# Patient Record
Sex: MALE | Race: WHITE | NOT HISPANIC OR LATINO | URBAN - NONMETROPOLITAN AREA
[De-identification: names, ages, dates, MRNs, and addresses within clinical notes are randomized per-mention and may not be internally consistent; named-entity substitution may affect disease eponyms.]

---
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
## 2019-04-03 ENCOUNTER — APPOINTMENT (OUTPATIENT)
Dept: GENERAL RADIOLOGY | Age: 19
End: 2019-04-03

## 2019-04-03 ENCOUNTER — HOSPITAL ENCOUNTER (EMERGENCY)
Age: 19
Discharge: HOME | End: 2019-04-03
Attending: FAMILY MEDICINE
Payer: COMMERCIAL

## 2019-04-03 VITALS
HEIGHT: 71 IN | OXYGEN SATURATION: 99 % | DIASTOLIC BLOOD PRESSURE: 78 MMHG | RESPIRATION RATE: 20 BRPM | BODY MASS INDEX: 25.9 KG/M2 | HEART RATE: 78 BPM | WEIGHT: 185 LBS | SYSTOLIC BLOOD PRESSURE: 122 MMHG | TEMPERATURE: 98 F

## 2019-04-03 DIAGNOSIS — M62.838 SPASM OF MUSCLE: ICD-10-CM

## 2019-04-03 DIAGNOSIS — S39.012A STRAIN OF LUMBAR REGION, INITIAL ENCOUNTER: Primary | ICD-10-CM

## 2019-04-03 PROCEDURE — 72100 X-RAY EXAM L-S SPINE 2/3 VWS: CPT

## 2019-04-03 PROCEDURE — 6360000002 HC RX W HCPCS

## 2019-04-03 PROCEDURE — 96372 THER/PROPH/DIAG INJ SC/IM: CPT

## 2019-04-03 PROCEDURE — 99283 EMERGENCY DEPT VISIT LOW MDM: CPT

## 2019-04-03 PROCEDURE — 99283 EMERGENCY DEPT VISIT LOW MDM: CPT | Performed by: FAMILY MEDICINE

## 2019-04-03 RX ORDER — KETOROLAC TROMETHAMINE 30 MG/ML
INJECTION, SOLUTION INTRAMUSCULAR; INTRAVENOUS
Status: COMPLETED
Start: 2019-04-03 | End: 2019-04-03

## 2019-04-03 RX ORDER — NAPROXEN 500 MG/1
500 TABLET ORAL 2 TIMES DAILY WITH MEALS
Qty: 10 TABLET | Refills: 0 | Status: SHIPPED | OUTPATIENT
Start: 2019-04-03 | End: 2019-04-13

## 2019-04-03 RX ORDER — KETOROLAC TROMETHAMINE 30 MG/ML
60 INJECTION, SOLUTION INTRAMUSCULAR; INTRAVENOUS ONCE
Status: COMPLETED | OUTPATIENT
Start: 2019-04-03 | End: 2019-04-03

## 2019-04-03 RX ADMIN — KETOROLAC TROMETHAMINE 60 MG: 30 INJECTION, SOLUTION INTRAMUSCULAR; INTRAVENOUS at 20:31

## 2019-04-03 ASSESSMENT — ENCOUNTER SYMPTOMS
ABDOMINAL PAIN: 0
COUGH: 0
WHEEZING: 0
TROUBLE SWALLOWING: 0
CHEST TIGHTNESS: 0
ABDOMINAL DISTENTION: 0
VOMITING: 0
SHORTNESS OF BREATH: 0
CONSTIPATION: 0
DIARRHEA: 0
BACK PAIN: 1
SORE THROAT: 0
APNEA: 0

## 2019-04-03 ASSESSMENT — PAIN SCALES - GENERAL
PAINLEVEL_OUTOF10: 6
PAINLEVEL_OUTOF10: 0
PAINLEVEL_OUTOF10: 6

## 2019-04-03 NOTE — ED TRIAGE NOTES
Pt was lifting a window today at work and felt a \"pop\" and had pain radiating up the left side of his back

## 2019-04-04 NOTE — ED NOTES
ASSESSMENT:    PT ALERT/ORIENTED X4. PUPILS EQUAL/REACTIVE    SKIN:  WARM/DRY PINK CAPILLARY REFILL < 2SECS    CARDIAC:  S1 S2 NOTED     LUNGS: CLEAR UPPER AND LOWER LOBES, RESPIRATIONS EVEN/UNLABORED     ABDOMEN: BOWEL SOUNDS NOTED UPPER AND LOWER QUADRANTS                     SOFT AND NONTENDER. EXTREMITIES:  BILATERAL DP AND PT AND NO EDEMA NOTED. C/o low back pain after lifting at work    NO DISTRESS NOTED. SIDE RAILS UP AND CALL LIGHT IN REACH.      Zenaida Lentz RN  04/03/19 [id number]

## 2019-04-04 NOTE — ED PROVIDER NOTES
[facility]  eMERGENCY dEPARTMENT eNCOUnter      Pt Name: Mauricio Hanna  MRN: [id number]  [place] 2000  Date of evaluation: 4/3/2019  Provider: Daphne Beckman MD    [address] [place] [address]       Chief Complaint   Patient presents with    Back Pain     left side         HISTORY OF PRESENT ILLNESS   (Location/Symptom, Timing/Onset,Context/Setting, Quality, Duration, Modifying Factors, Severity)  Note limiting factors. Mauricio Hanna is a 22 y.o. male who presents to the emergency department . Patient states that he was lifting something heavily prior to arrival and felt a sharp pain in his left lower back. He denies any radiation of the pain. He denies any numbness or weakness. He states that the pain is worse with movement. HPI    NursingNotes were reviewed. REVIEW OF SYSTEMS    (2-9 systems for level 4, 10 or more for level 5)     Review of Systems   Constitutional: Negative for diaphoresis and fever. HENT: Negative for sore throat and trouble swallowing. Eyes: Negative for visual disturbance. Respiratory: Negative for apnea, cough, chest tightness, shortness of breath and wheezing. Cardiovascular: Negative for chest pain, palpitations and leg swelling. Gastrointestinal: Negative for abdominal distention, abdominal pain, constipation, diarrhea and vomiting. Musculoskeletal: Positive for back pain. Negative for myalgias. Skin: Negative for pallor. Neurological: Negative for dizziness, weakness, light-headedness and headaches. Psychiatric/Behavioral: Negative for behavioral problems and suicidal ideas. All other systems reviewed and are negative.            PAST MEDICALHISTORY     Past Medical History:   Diagnosis Date    ADHD (attention deficit hyperactivity disorder)          SURGICAL HISTORY       Past Surgical History:   Procedure Laterality Date    HAND SURGERY Right     thumb         CURRENT MEDICATIONS     Previous Medications    AMPHETAMINE-DEXTROAMPHETAMINE (ADDERALL XR) 25 MG XR CAPSULE    Take 25 mg by mouth every morning    CLONIDINE (CATAPRES) 0.1 MG TABLET    Take 0.1 mg by mouth daily       ALLERGIES     Patient has no known allergies. FAMILY HISTORY     History reviewed. No pertinent family history. SOCIAL HISTORY       Social History     Socioeconomic History    Marital status: Single     Spouse name: None    Number of children: None    Years of education: None    Highest education level: None   Occupational History    None   Social Needs    Financial resource strain: None    Food insecurity:     Worry: None     Inability: None    Transportation needs:     Medical: None     Non-medical: None   Tobacco Use    Smoking status: Current Every Day Smoker     Packs/day: 0.50     Types: Cigarettes    Smokeless tobacco: Never Used   Substance and Sexual Activity    Alcohol use: Yes     Comment: occasional    Drug use: No    Sexual activity: None   Lifestyle    Physical activity:     Days per week: None     Minutes per session: None    Stress: None   Relationships    Social connections:     Talks on phone: None     Gets together: None     Attends Orthodoxy service: None     Active member of club or organization: None     Attends meetings of clubs or organizations: None     Relationship status: None    Intimate partner violence:     Fear of current or ex partner: None     Emotionally abused: None     Physically abused: None     Forced sexual activity: None   Other Topics Concern    None   Social History Narrative    None       SCREENINGS             PHYSICAL EXAM    (up to 7 for level 4, 8 or more for level 5)     ED Triage Vitals [04/03/19 1733]   BP Temp Temp Source Heart Rate Resp SpO2 Height Weight - Scale   129/66 99.1 °F (37.3 °C) Temporal 99 16 96 % 5' 11\" (1.803 m) 185 lb (83.9 kg)       Physical Exam   Constitutional: He is oriented to person, place, and time. He appears well-developed and well-nourished.    HENT:   Head: Normocephalic and Mortality  Presenting problems: moderate  Diagnostic procedures: moderate  Management options: moderate    Patient Progress  Patient progress: stable      Reassessment  Patient improved after intramuscular Toradol. He is stable for discharge home. He will be given a prescription for Anaprox twice a day when necessary. CONSULTS:  None    PROCEDURES:  Unless otherwise noted below, none     Procedures    FINAL IMPRESSION      1. Strain of lumbar region, initial encounter    2.  Spasm of muscle          DISPOSITION/PLAN   DISPOSITION Decision To Discharge 04/03/2019 10:20:28 PM      PATIENT REFERRED TO:  Monette Lipoma  [address], [address] [id number]      As needed      DISCHARGE MEDICATIONS:  New Prescriptions    NAPROXEN (NAPROSYN) 500 MG TABLET    Take 1 tablet by mouth 2 times daily (with meals) for 10 days          (Please note that portions of this note were completed with a voice recognition program.  Efforts were made to edit thedictations but occasionally words are mis-transcribed.)    Fauzia Harris MD (electronically signed)  Attending Emergency Physician          Fauzia Harris MD  04/03/19 2898 Kierra Hui

## 2020-03-19 ENCOUNTER — HOSPITAL ENCOUNTER (OUTPATIENT)
Dept: GENERAL RADIOLOGY | Age: 20
Discharge: HOME | End: 2020-03-19
Payer: COMMERCIAL

## 2020-03-19 ENCOUNTER — OFFICE VISIT (OUTPATIENT)
Dept: URGENT CARE | Age: 20
End: 2020-03-19
Payer: COMMERCIAL

## 2020-03-19 VITALS
OXYGEN SATURATION: 97 % | HEIGHT: 70 IN | HEART RATE: 77 BPM | SYSTOLIC BLOOD PRESSURE: 119 MMHG | RESPIRATION RATE: 18 BRPM | BODY MASS INDEX: 28.06 KG/M2 | WEIGHT: 196 LBS | DIASTOLIC BLOOD PRESSURE: 63 MMHG | TEMPERATURE: 97.9 F

## 2020-03-19 LAB
AMYLASE: 52 U/L (ref 28–100)
APPEARANCE FLUID: ABNORMAL
BILIRUBIN, POC: NEGATIVE
BLOOD URINE, POC: NEGATIVE
C-REACTIVE PROTEIN: <0.03 MG/DL (ref 0–0.5)
CLARITY, POC: CLEAR
COLOR, POC: ABNORMAL
GLUCOSE URINE, POC: NEGATIVE
HCT VFR BLD CALC: 45.5 % (ref 42–52)
HEMOGLOBIN: 14.9 G/DL (ref 14–18)
KETONES, POC: NEGATIVE
LEUKOCYTE EST, POC: NEGATIVE
LIPASE: 18 U/L (ref 13–60)
MCH RBC QN AUTO: 28.5 PG (ref 27–31)
MCHC RBC AUTO-ENTMCNC: 32.7 G/DL (ref 33–37)
MCV RBC AUTO: 87 FL (ref 80–94)
NITRITE, POC: NEGATIVE
PDW BLD-RTO: 11.9 % (ref 11.5–14.5)
PH, POC: 6.5
PLATELET # BLD: 261 K/UL (ref 130–400)
PMV BLD AUTO: 9.3 FL (ref 9.4–12.4)
PROTEIN, POC: ABNORMAL
RBC # BLD: 5.23 M/UL (ref 4.7–6.1)
REASON FOR REJECTION: NORMAL
REJECTED TEST: NORMAL
SPECIFIC GRAVITY, POC: >=1.03
UROBILINOGEN, POC: 1
WBC # BLD: 8.4 K/UL (ref 4.8–10.8)

## 2020-03-19 PROCEDURE — 81002 URINALYSIS NONAUTO W/O SCOPE: CPT | Performed by: NURSE PRACTITIONER

## 2020-03-19 PROCEDURE — 99202 OFFICE O/P NEW SF 15 MIN: CPT | Performed by: NURSE PRACTITIONER

## 2020-03-19 PROCEDURE — 74018 RADEX ABDOMEN 1 VIEW: CPT

## 2020-03-19 PROCEDURE — 36415 COLL VENOUS BLD VENIPUNCTURE: CPT | Performed by: NURSE PRACTITIONER

## 2020-03-19 ASSESSMENT — ENCOUNTER SYMPTOMS
DIARRHEA: 0
NAUSEA: 0
ABDOMINAL PAIN: 1
VOMITING: 0

## 2020-03-19 NOTE — PROGRESS NOTES
[facility]  [address] [place] [address] [address]  [address] [place] [id number]  Dept: [phone]  Loc: [phone]    Héctor Ashley is a 20 y.o. male who presents today for his medical conditions/complaintsas noted below. Héctor Ashley is c/o of Abdominal Pain (lower abd pain;pain does not radiate; worse last night; pain 4-5 out of 10) and Fatigue        HPI:     Abdominal Pain   This is a new problem. The current episode started yesterday. The problem occurs constantly. The problem has been waxing and waning (always there but worsens at times). The pain is located in the RLQ and LLQ. The quality of the pain is cramping. The abdominal pain radiates to the suprapubic region. Pertinent negatives include no diarrhea, dysuria, fever, hematuria, nausea or vomiting. The pain is aggravated by certain positions. The pain is relieved by being still. Treatments tried: Ibuprofen, Pepto Bismol. The treatment provided no relief. Pt states that it started last night and pain was 9/10 last night, he took pepto bismol and drank some water and he has not had that severe of pain since. Past Medical History:   Diagnosis Date    ADHD (attention deficit hyperactivity disorder)      Past Surgical History:   Procedure Laterality Date    HAND SURGERY Right     thumb       History reviewed. No pertinent family history.     Social History     Tobacco Use    Smoking status: Current Every Day Smoker     Packs/day: 0.50     Types: Cigarettes    Smokeless tobacco: Never Used   Substance Use Topics    Alcohol use: Yes     Comment: occasional      Current Outpatient Medications   Medication Sig Dispense Refill    naproxen (NAPROSYN) 500 MG tablet Take 1 tablet by mouth 2 times daily (with meals) for 10 days 10 tablet 0    cloNIDine (CATAPRES) 0.1 MG tablet Take 0.1 mg by mouth daily      amphetamine-dextroamphetamine (ADDERALL XR) 25 MG XR capsule Take 25 mg by mouth every morning       No encounter. Patient given educational materials- see patient instructions. Discussed use, benefit, and side effects of prescribedmedications. All patient questions answered. Pt voiced understanding. Reviewedhealth maintenance. Instructed to continue current medications, diet and exercise. Patient agreed with treatment plan. Follow up as directed. Patient Instructions     If worsening or no improvement of symptoms, you must go to ER. Patient Education        Abdominal Pain: Care Instructions  Your Care Instructions    Abdominal pain has many possible causes. Some aren't serious and get better on their own in a few days. Others need more testing and treatment. If your pain continues or gets worse, you need to be rechecked and may need more tests to find out what is wrong. You may need surgery to correct the problem. Don't ignore new symptoms, such as fever, nausea and vomiting, urination problems, pain that gets worse, and dizziness. These may be signs of a more serious problem. Your doctor may have recommended a follow-up visit in the next 8 to 12 hours. If you are not getting better, you may need more tests or treatment. The doctor has checked you carefully, but problems can develop later. If you notice any problems or new symptoms, get medical treatment right away. Follow-up care is a key part of your treatment and safety. Be sure to make and go to all appointments, and call your doctor if you are having problems. It's also a good idea to know your test results and keep a list of the medicines you take. How can you care for yourself at home? · Rest until you feel better. · To prevent dehydration, drink plenty of fluids, enough so that your urine is light yellow or clear like water. Choose water and other caffeine-free clear liquids until you feel better.  If you have kidney, heart, or liver disease and have to limit fluids, talk with your doctor before you increase the amount of fluids you by [facility] If you have questions about a medical condition or this instruction, always ask your healthcare professional. [place] [address] any warranty or liability for your use of this information.                Electronically signed by HODA Morrissey CNP on 3/19/2020 at 4:07 PM

## 2020-03-19 NOTE — LETTER
[address] [place] [address] Urgent Care  [address] [place] [id number]  Phone: [phone]    HODA Monk CNP        March 19, 2020     Patient: Bernice Beltre   YOB: 2000   Date of Visit: 3/19/2020       To Whom it May Concern:    Taj Guevara was seen in my clinic on 3/19/2020. He may return to work on 3/20/2020. If you have any questions or concerns, please don't hesitate to call.     Sincerely,         HODA Monk CNP

## 2020-08-28 ENCOUNTER — OFFICE VISIT (OUTPATIENT)
Age: 20
End: 2020-08-28

## 2020-08-28 VITALS — TEMPERATURE: 97.5 F | HEART RATE: 93 BPM | OXYGEN SATURATION: 98 %

## 2020-08-31 LAB — SARS-COV-2, NAA: NOT DETECTED

## 2020-09-22 ENCOUNTER — OFFICE VISIT (OUTPATIENT)
Age: 20
End: 2020-09-22

## 2020-09-22 ENCOUNTER — OFFICE VISIT (OUTPATIENT)
Dept: URGENT CARE | Age: 20
End: 2020-09-22
Payer: COMMERCIAL

## 2020-09-22 VITALS
SYSTOLIC BLOOD PRESSURE: 132 MMHG | RESPIRATION RATE: 18 BRPM | WEIGHT: 180 LBS | DIASTOLIC BLOOD PRESSURE: 82 MMHG | HEART RATE: 89 BPM | BODY MASS INDEX: 25.77 KG/M2 | TEMPERATURE: 97.7 F | HEIGHT: 70 IN | OXYGEN SATURATION: 98 %

## 2020-09-22 VITALS — OXYGEN SATURATION: 98 % | TEMPERATURE: 97.7 F | HEART RATE: 89 BPM

## 2020-09-22 LAB
INFLUENZA A ANTIBODY: NEGATIVE
INFLUENZA B ANTIBODY: NEGATIVE
S PYO AG THROAT QL: NORMAL

## 2020-09-22 PROCEDURE — 87804 INFLUENZA ASSAY W/OPTIC: CPT | Performed by: NURSE PRACTITIONER

## 2020-09-22 PROCEDURE — 87880 STREP A ASSAY W/OPTIC: CPT | Performed by: NURSE PRACTITIONER

## 2020-09-22 PROCEDURE — 99999 PR OFFICE/OUTPT VISIT,PROCEDURE ONLY: CPT | Performed by: NURSE PRACTITIONER

## 2020-09-22 PROCEDURE — 99213 OFFICE O/P EST LOW 20 MIN: CPT | Performed by: NURSE PRACTITIONER

## 2020-09-22 RX ORDER — ALBUTEROL SULFATE 90 UG/1
1-2 AEROSOL, METERED RESPIRATORY (INHALATION) EVERY 4 HOURS PRN
Qty: 1 INHALER | Refills: 0 | Status: SHIPPED | OUTPATIENT
Start: 2020-09-22

## 2020-09-22 ASSESSMENT — ENCOUNTER SYMPTOMS
SHORTNESS OF BREATH: 0
SORE THROAT: 1
WHEEZING: 1
COUGH: 1
RHINORRHEA: 1

## 2020-09-22 NOTE — PROGRESS NOTES
(VENTOLIN HFA) 108 (90 Base) MCG/ACT inhaler     COVID swab today. If negative and no improvement of symptoms, may consider antibiotics due to patient being a smoker. PLAN:     Return if symptoms worsen or fail to improve. Patient given educational materials - see patient instructions. Discussed use, benefit, and side effects of prescribed medications. All patient questions answered. Pt voiced understanding. Patient agreed with treatment plan.  Follow up as needed      Electronically signed by HODA Vivas CNP on 9/22/2020 at 9:43 AM

## 2020-09-22 NOTE — PATIENT INSTRUCTIONS
Quarantine until results are back. Increase fluids. Return to clinic if worsening or no improvement. Patient Education        Sore Throat: Care Instructions  Your Care Instructions     Infection by bacteria or a virus causes most sore throats. Cigarette smoke, dry air, air pollution, allergies, and yelling can also cause a sore throat. Sore throats can be painful and annoying. Fortunately, most sore throats go away on their own. If you have a bacterial infection, your doctor may prescribe antibiotics. Follow-up care is a key part of your treatment and safety. Be sure to make and go to all appointments, and call your doctor if you are having problems. It's also a good idea to know your test results and keep a list of the medicines you take. How can you care for yourself at home? · If your doctor prescribed antibiotics, take them as directed. Do not stop taking them just because you feel better. You need to take the full course of antibiotics. · Gargle with warm salt water once an hour to help reduce swelling and relieve discomfort. Use 1 teaspoon of salt mixed in 1 cup of warm water. · Take an over-the-counter pain medicine, such as acetaminophen (Tylenol), ibuprofen (Advil, Motrin), or naproxen (Aleve). Read and follow all instructions on the label. · Be careful when taking over-the-counter cold or flu medicines and Tylenol at the same time. Many of these medicines have acetaminophen, which is Tylenol. Read the labels to make sure that you are not taking more than the recommended dose. Too much acetaminophen (Tylenol) can be harmful. · Drink plenty of fluids. Fluids may help soothe an irritated throat. Hot fluids, such as tea or soup, may help decrease throat pain. · Use over-the-counter throat lozenges to soothe pain. Regular cough drops or hard candy may also help. These should not be given to young children because of the risk of choking. · Do not smoke or allow others to smoke around you.  If you need help quitting, talk to your doctor about stop-smoking programs and medicines. These can increase your chances of quitting for good. · Use a vaporizer or humidifier to add moisture to your bedroom. Follow the directions for cleaning the machine. When should you call for help? Call your doctor now or seek immediate medical care if:  · You have new or worse trouble swallowing. · Your sore throat gets much worse on one side. Watch closely for changes in your health, and be sure to contact your doctor if you do not get better as expected. Where can you learn more? Go to https://Visiogenpepiceweb.RightsFlow. org and sign in to your [facility] account. Enter I791 in the Colibri [employer] box to learn more about \"Sore Throat: Care Instructions. \"     If you do not have an account, please click on the \"Sign Up Now\" link. Current as of: July 29, 2019               Content Version: 12.5  © 6334-2590 [employer]. Care instructions adapted under license by [facility] [employer] [facility] ([facility]). If you have questions about a medical condition or this instruction, always ask your healthcare professional. [place] [address] any warranty or liability for your use of this information. Patient Education        Wheezing or Bronchoconstriction: Care Instructions  Your Care Instructions  Wheezing is a whistling noise made during breathing. It occurs when the small airways, or bronchial tubes, that lead to your lungs swell or contract (spasm) and become narrow. This narrowing is called bronchoconstriction. When your airways constrict, it is hard for air to pass through and this makes it hard for you to breathe. Wheezing and bronchoconstriction can be caused by many problems, including:  · An infection such as the flu or a cold. · Allergies such as hay fever. · Diseases such as asthma or chronic obstructive pulmonary disease. · Smoking. Treatment for your wheezing depends on what is causing the problem. Your wheezing may get better without treatment. But you may need to pay attention to things that cause your wheezing and avoid them. Or you may need medicine to help treat the wheezing and to reduce the swelling or to relieve spasms in your lungs. Follow-up care is a key part of your treatment and safety. Be sure to make and go to all appointments, and call your doctor if you are having problems. It is also a good idea to know your test results and keep a list of the medicines you take. How can you care for yourself at home? · Take your medicine exactly as prescribed. Call your doctor if you think you are having a problem with your medicine. You will get more details on the specific medicine your doctor prescribes. · If your doctor prescribed antibiotics, take them as directed. Do not stop taking them just because you feel better. You need to take the full course of antibiotics. · Breathe moist air from a humidifier, hot shower, or sink filled with hot water. This may help ease your symptoms and make it easier for you to breathe. · If you have congestion in your nose and throat, drinking plenty of fluids, especially hot fluids, may help relieve your symptoms. If you have kidney, heart, or liver disease and have to limit fluids, talk with your doctor before you increase the amount of fluids you drink. · If you have mucus in your airways, it may help to breathe deeply and cough. · Do not smoke or allow others to smoke around you. Smoking can make your wheezing worse. If you need help quitting, talk to your doctor about stop-smoking programs and medicines. These can increase your chances of quitting for good. · Avoid things that may cause your wheezing. These may include colds, smoke, air pollution, dust, pollen, pets, cockroaches, stress, and cold air. When should you call for help? [id number] anytime you think you may need emergency care. For example, call if:  · You have severe trouble breathing.   · You passed out (lost consciousness). Call your doctor now or seek immediate medical care if:  · You cough up yellow, dark brown, or bloody mucus (sputum). · You have new or worse shortness of breath. · Your wheezing is not getting better or it gets worse after you start taking your medicine. Watch closely for changes in your health, and be sure to contact your doctor if:  · You do not get better as expected. Where can you learn more? Go to https://Agentrun.Olive Media. org and sign in to your [employer] account. Enter 096 [id number] in the [employer] box to learn more about \"Wheezing or Bronchoconstriction: Care Instructions. \"     If you do not have an account, please click on the \"Sign Up Now\" link. Current as of: February 24, 2020               Content Version: 12.5  © 4702-1528 [employer]. Care instructions adapted under license by [facility] ([facility]). If you have questions about a medical condition or this instruction, always ask your healthcare professional. [place] [address] any warranty or liability for your use of this information. Patient Education        Learning About Fever  What is a fever? A fever is a high body temperature. It's one way your body fights being sick. A fever shows that the body is responding to infection or other illnesses, both minor and severe. A fever is a symptom, not an illness by itself. A fever can be a sign that you are ill, but most fevers are not caused by a serious problem. You may have a fever with a minor illness, such as a cold. But sometimes a very serious infection may cause little or no fever. It is important to look at other symptoms, other conditions you have, and how you feel in general. In children, notice how they act and see what symptoms they complain of. What is a normal body temperature? A normal body temperature is about 98. 6ºF.  Some people have a normal temperature that is a little higher or a little lower 12.5  © 8922-5451 Healthwise, Incorporated. Care instructions adapted under license by [facility] ([facility]). If you have questions about a medical condition or this instruction, always ask your healthcare professional. [address] any warranty or liability for your use of this information.

## 2020-09-24 LAB — SARS-COV-2, NAA: NOT DETECTED

## 2021-03-22 ENCOUNTER — OFFICE VISIT (OUTPATIENT)
Dept: URGENT CARE | Age: 21
End: 2021-03-22
Payer: COMMERCIAL

## 2021-03-22 VITALS
SYSTOLIC BLOOD PRESSURE: 144 MMHG | HEART RATE: 82 BPM | BODY MASS INDEX: 31.71 KG/M2 | TEMPERATURE: 97.3 F | DIASTOLIC BLOOD PRESSURE: 76 MMHG | OXYGEN SATURATION: 99 % | WEIGHT: 221 LBS

## 2021-03-22 DIAGNOSIS — J01.00 ACUTE NON-RECURRENT MAXILLARY SINUSITIS: Primary | ICD-10-CM

## 2021-03-22 PROCEDURE — 99213 OFFICE O/P EST LOW 20 MIN: CPT | Performed by: NURSE PRACTITIONER

## 2021-03-22 RX ORDER — AMOXICILLIN AND CLAVULANATE POTASSIUM 875; 125 MG/1; MG/1
1 TABLET, FILM COATED ORAL 2 TIMES DAILY
Qty: 14 TABLET | Refills: 0 | Status: SHIPPED | OUTPATIENT
Start: 2021-03-22 | End: 2021-03-29

## 2021-03-22 RX ORDER — METHYLPREDNISOLONE 4 MG/1
TABLET ORAL
Qty: 1 KIT | Refills: 0 | Status: SHIPPED | OUTPATIENT
Start: 2021-03-22 | End: 2021-03-28

## 2021-03-22 ASSESSMENT — ENCOUNTER SYMPTOMS
GASTROINTESTINAL NEGATIVE: 1
STRIDOR: 0
WHEEZING: 0
SHORTNESS OF BREATH: 0
SINUS PRESSURE: 1
SORE THROAT: 0
COUGH: 0

## 2021-03-22 NOTE — PATIENT INSTRUCTIONS
Start medrol dosepak    Start Augmentin today    Ok to continue flonase    Return as needed    Patient Education        Sinusitis: Care Instructions  Your Care Instructions     Sinusitis is an infection of the lining of the sinus cavities in your head. Sinusitis often follows a cold. It causes pain and pressure in your head and face. In most cases, sinusitis gets better on its own in 1 to 2 weeks. But some mild symptoms may last for several weeks. Sometimes antibiotics are needed. Follow-up care is a key part of your treatment and safety. Be sure to make and go to all appointments, and call your doctor if you are having problems. It's also a good idea to know your test results and keep a list of the medicines you take. How can you care for yourself at home? · Take an over-the-counter pain medicine, such as acetaminophen (Tylenol), ibuprofen (Advil, Motrin), or naproxen (Aleve). Read and follow all instructions on the label. · If the doctor prescribed antibiotics, take them as directed. Do not stop taking them just because you feel better. You need to take the full course of antibiotics. · Be careful when taking over-the-counter cold or flu medicines and Tylenol at the same time. Many of these medicines have acetaminophen, which is Tylenol. Read the labels to make sure that you are not taking more than the recommended dose. Too much acetaminophen (Tylenol) can be harmful. · Breathe warm, moist air from a steamy shower, a hot bath, or a sink filled with hot water. Avoid cold, dry air. Using a humidifier in your home may help. Follow the directions for cleaning the machine. · Use saline (saltwater) nasal washes. This can help keep your nasal passages open and wash out mucus and bacteria. You can buy saline nose drops at a grocery store or drugstore. Or you can make your own at home by adding 1 teaspoon of salt and 1 teaspoon of baking soda to 2 cups of distilled water.  If you make your own, fill a bulb syringe

## 2021-03-22 NOTE — PROGRESS NOTES
[address] [place] [address]   [address], [id number]     Phone:  [phone]  Fax:  [phone]      Magalis Hawkins is a 23 y.o. male who presents today for his medical conditions/complaints as noted below. Magalis Hawkins is c/o of Nasal Congestion (reports pain in nares that makes his vision blurry at times, very painful, happens when he inhales, pt reports he is congested, onset about 5 days ago )      Chief Complaint   Patient presents with    Nasal Congestion     reports pain in nares that makes his vision blurry at times, very painful, happens when he inhales, pt reports he is congested, onset about 5 days ago        HPI:       Magalis Hawkins presents today for nasal congestion and pain in nares x 5 days. Sinusitis  This is a new problem. The current episode started 1 to 4 weeks ago. The problem has been gradually worsening since onset. There has been no fever. The pain is mild. Associated symptoms include congestion, headaches and sinus pressure. Pertinent negatives include no coughing, ear pain, neck pain, shortness of breath or sore throat. Treatments tried: flonase. The treatment provided mild (works for about 30 minutes) relief. Past Medical History:   Diagnosis Date    ADHD (attention deficit hyperactivity disorder)         Past Surgical History:   Procedure Laterality Date    HAND SURGERY Right     thumb       Social History     Tobacco Use    Smoking status: Current Every Day Smoker     Packs/day: 0.50     Types: Cigarettes    Smokeless tobacco: Never Used   Substance Use Topics    Alcohol use: Yes     Comment: occasional        Current Outpatient Medications   Medication Sig Dispense Refill    methylPREDNISolone (MEDROL DOSEPACK) 4 MG tablet Take by mouth.  1 kit 0    amoxicillin-clavulanate (AUGMENTIN) 875-125 MG per tablet Take 1 tablet by mouth 2 times daily for 7 days 14 tablet 0    albuterol sulfate HFA (VENTOLIN HFA) 108 (90 Base) MCG/ACT inhaler Inhale 1-2 puffs into the lungs every 4 hours as needed for Wheezing or Shortness of Breath (Patient not taking: Reported on 3/22/2021) 1 Inhaler 0    naproxen (NAPROSYN) 500 MG tablet Take 1 tablet by mouth 2 times daily (with meals) for 10 days 10 tablet 0    cloNIDine (CATAPRES) 0.1 MG tablet Take 0.1 mg by mouth daily      amphetamine-dextroamphetamine (ADDERALL XR) 25 MG XR capsule Take 25 mg by mouth every morning       No current facility-administered medications for this visit. No Known Allergies    No family history on file. Review of Systems   Constitutional: Negative for fever. HENT: Positive for congestion and sinus pressure. Negative for ear pain, nosebleeds and sore throat. Respiratory: Negative for cough, shortness of breath, wheezing and stridor. Cardiovascular: Negative. Gastrointestinal: Negative. Musculoskeletal: Negative for neck pain. Neurological: Positive for headaches. Negative for dizziness, weakness and light-headedness. Objective:     Physical Exam  Vitals signs and nursing note reviewed. Constitutional:       General: He is not in acute distress. Appearance: Normal appearance. He is well-developed. He is obese. He is not ill-appearing, toxic-appearing or diaphoretic. HENT:      Head: Normocephalic and atraumatic. Right Ear: Tympanic membrane, ear canal and external ear normal. There is no impacted cerumen. Left Ear: Tympanic membrane, ear canal and external ear normal. There is no impacted cerumen. Nose: Mucosal edema, congestion and rhinorrhea present. Right Sinus: Maxillary sinus tenderness present. Left Sinus: Maxillary sinus tenderness present. Mouth/Throat:      Pharynx: No oropharyngeal exudate or posterior oropharyngeal erythema. Eyes:      General:         Right eye: No discharge. Left eye: No discharge.       Conjunctiva/sclera: Conjunctivae normal.   Neck:      Musculoskeletal: Normal range of motion and neck supple. Cardiovascular:      Rate and Rhythm: Normal rate and regular rhythm. Heart sounds: Normal heart sounds. Pulmonary:      Effort: Pulmonary effort is normal. No respiratory distress. Breath sounds: Normal breath sounds. No stridor. No wheezing, rhonchi or rales. Musculoskeletal: Normal range of motion. Lymphadenopathy:      Cervical: No cervical adenopathy. Skin:     General: Skin is warm and dry. Capillary Refill: Capillary refill takes less than 2 seconds. Findings: No rash. Neurological:      Mental Status: He is alert and oriented to person, place, and time. Motor: No weakness. Gait: Gait normal.   Psychiatric:         Mood and Affect: Mood normal.         Behavior: Behavior normal.         Thought Content: Thought content normal.         BP (!) 144/76   Pulse 82   Temp 97.3 °F (36.3 °C)   Wt 221 lb (100.2 kg)   SpO2 99%   BMI 31.71 kg/m²     Assessment:      Diagnosis Orders   1. Acute non-recurrent maxillary sinusitis  methylPREDNISolone (MEDROL DOSEPACK) 4 MG tablet    amoxicillin-clavulanate (AUGMENTIN) 875-125 MG per tablet       No results found for this visit on 03/22/21. Plan:     Augmentin    Medrol dosepak    Tylenol for pain    Continue flonase    Return if symptoms worsen or fail to improve. No orders of the defined types were placed in this encounter. Orders Placed This Encounter   Medications    methylPREDNISolone (MEDROL DOSEPACK) 4 MG tablet     Sig: Take by mouth. Dispense:  1 kit     Refill:  0    amoxicillin-clavulanate (AUGMENTIN) 875-125 MG per tablet     Sig: Take 1 tablet by mouth 2 times daily for 7 days     Dispense:  14 tablet     Refill:  0        Patient offered educational materials - see patient instructions for any instruction needed. Discussed use, benefit, and side effects of prescribed medications. All patient questions answered. Instructed to continue current medications, diet and exercise.   Patient agreed with treatment plan. Follow up as directed. Patient was advised to go to the ED if condition ever becomes emergent.        Electronically signed by Claudetta Phy on 3/22/2021 at 3:23 PM

## 2021-03-22 NOTE — LETTER
[facility]  [address] [id number]  Phone: [phone]  Fax: [phone], APRN        March 22, 2021     Patient: Claudell Gails   YOB: 2000   Date of Visit: 3/22/2021       To Whom it May Concern:    Enedelia Young was seen in my clinic on 3/22/2021. He may return to work on 3/24/2021. If you have any questions or concerns, please don't hesitate to call.     Sincerely,         HODA Mathews

## 2021-06-18 ENCOUNTER — OFFICE VISIT (OUTPATIENT)
Dept: URGENT CARE | Age: 21
End: 2021-06-18
Payer: COMMERCIAL

## 2021-06-18 VITALS
BODY MASS INDEX: 31.69 KG/M2 | TEMPERATURE: 97.9 F | SYSTOLIC BLOOD PRESSURE: 137 MMHG | OXYGEN SATURATION: 97 % | DIASTOLIC BLOOD PRESSURE: 76 MMHG | HEIGHT: 72 IN | WEIGHT: 234 LBS | HEART RATE: 71 BPM

## 2021-06-18 DIAGNOSIS — R51.9 FRONTAL HEADACHE: Primary | ICD-10-CM

## 2021-06-18 LAB
ANION GAP SERPL CALCULATED.3IONS-SCNC: 10 MMOL/L (ref 7–19)
BASOPHILS ABSOLUTE: 0 K/UL (ref 0–0.2)
BASOPHILS RELATIVE PERCENT: 0.4 % (ref 0–1)
BUN BLDV-MCNC: 8 MG/DL (ref 6–20)
CALCIUM SERPL-MCNC: 8.8 MG/DL (ref 8.6–10)
CHLORIDE BLD-SCNC: 104 MMOL/L (ref 98–111)
CO2: 26 MMOL/L (ref 22–29)
CREAT SERPL-MCNC: 0.8 MG/DL (ref 0.5–1.2)
EOSINOPHILS ABSOLUTE: 0.4 K/UL (ref 0–0.6)
EOSINOPHILS RELATIVE PERCENT: 6.3 % (ref 0–5)
GFR AFRICAN AMERICAN: >59
GFR NON-AFRICAN AMERICAN: >60
GLUCOSE BLD-MCNC: 98 MG/DL (ref 74–109)
HCT VFR BLD CALC: 47.6 % (ref 42–52)
HEMOGLOBIN: 15.5 G/DL (ref 14–18)
IMMATURE GRANULOCYTES #: 0 K/UL
LYMPHOCYTES ABSOLUTE: 1.9 K/UL (ref 1.1–4.5)
LYMPHOCYTES RELATIVE PERCENT: 33.1 % (ref 20–40)
MCH RBC QN AUTO: 29.1 PG (ref 27–31)
MCHC RBC AUTO-ENTMCNC: 32.6 G/DL (ref 33–37)
MCV RBC AUTO: 89.5 FL (ref 80–94)
MONOCYTES ABSOLUTE: 0.7 K/UL (ref 0–0.9)
MONOCYTES RELATIVE PERCENT: 12.3 % (ref 0–10)
NEUTROPHILS ABSOLUTE: 2.7 K/UL (ref 1.5–7.5)
NEUTROPHILS RELATIVE PERCENT: 47.5 % (ref 50–65)
PDW BLD-RTO: 11.9 % (ref 11.5–14.5)
PLATELET # BLD: 195 K/UL (ref 130–400)
PMV BLD AUTO: 9.2 FL (ref 9.4–12.4)
POTASSIUM SERPL-SCNC: 4.2 MMOL/L (ref 3.5–5)
RBC # BLD: 5.32 M/UL (ref 4.7–6.1)
SODIUM BLD-SCNC: 140 MMOL/L (ref 136–145)
WBC # BLD: 5.7 K/UL (ref 4.8–10.8)

## 2021-06-18 PROCEDURE — 99214 OFFICE O/P EST MOD 30 MIN: CPT | Performed by: NURSE PRACTITIONER

## 2021-06-18 RX ORDER — KETOROLAC TROMETHAMINE 10 MG/1
10 TABLET, FILM COATED ORAL EVERY 6 HOURS PRN
Qty: 20 TABLET | Refills: 0 | Status: SHIPPED | OUTPATIENT
Start: 2021-06-18 | End: 2022-06-18

## 2021-06-18 RX ORDER — TIZANIDINE 4 MG/1
4 TABLET ORAL EVERY 8 HOURS PRN
Qty: 15 TABLET | Refills: 0 | Status: SHIPPED | OUTPATIENT
Start: 2021-06-18

## 2021-06-18 ASSESSMENT — ENCOUNTER SYMPTOMS
COUGH: 0
EYE PAIN: 0
PHOTOPHOBIA: 0
FACIAL SWEATING: 0
EYE REDNESS: 0
BACK PAIN: 0
ABDOMINAL PAIN: 0
BLURRED VISION: 1
NAUSEA: 0
RHINORRHEA: 0
SCALP TENDERNESS: 1
EYE WATERING: 0

## 2021-06-18 NOTE — PATIENT INSTRUCTIONS
Patient Education     Labs were unremarkable today     Follow up with primary care provider    I have referred you to neurology for ongoing headaches    I have sent in Toradol and zanaflex for your headaches. Take as needed. Zanaflex may make you drowsy and do not take Toradol with any other NSAIDs such as ibuprofen, naproxen    Go to ER with worsening headache or vision changes, confusion, lethargy, or dizziness    See your optometrist for an eye check    Return as needed  Tension Headache: Care Instructions  Overview  Most headaches are tension headaches. Some people get them often, especially if they have a lot of stress in their lives. This kind of headache may cause pain or a feeling of pressure all over your head. Sometimes it's hard to know where the center of the pain is. If you get a lot of these kind of headaches, the best way to reduce them is to find out what's causing them. Then you can make changes in those areas. Follow-up care is a key part of your treatment and safety. Be sure to make and go to all appointments, and call your doctor if you are having problems. It's also a good idea to know your test results and keep a list of the medicines you take. How can you care for yourself at home? · Rest in a quiet, dark room. Put a cool cloth on your forehead. Close your eyes, and try to relax or go to sleep. Do not watch TV, read, or use the computer. · Use a warm, moist towel or a heating pad set on low to relax tight shoulder and neck muscles. · Have someone gently massage your neck and shoulders. · Be safe with medicines. Read and follow all instructions on the label. ? If the doctor gave you a prescription medicine for pain, take it as prescribed. ? If you are not taking a prescription pain medicine, ask your doctor if you can take an over-the-counter medicine. · Be careful not to take more pain medicine than the instructions say.  This is because you may get worse or more frequent headaches when the medicine wears off. · If you get a headache, stop what you are doing and sit quietly for a moment. Close your eyes and breathe slowly. Try to relax your head and neck muscles. · Pay attention to any new symptoms you have when you have a headache. These include a fever, weakness or numbness, vision changes, or confusion. They may be signs of a more serious problem. To help prevent headaches  · Keep a headache diary. This can help you and your doctor figure out what triggers your headaches. If you avoid your triggers, you may be able to prevent headaches. · It's good to include several things in your headache diary. Write down when a headache begins and how long it lasts. Try to describe what the pain was like (throbbing, aching, stabbing, or dull). Then add anything you think may have triggered the headache. This could include stress, anxiety, or depression. It could also include hunger, anger, or fatigue. Sometimes, bad posture and muscle strain are triggers for people. · Find healthy ways to deal with stress. Headaches are most common during or right after stressful times. Take time to relax before and after you do something that caused a headache in the past.  · Get plenty of exercise every day. Go for a walk or jog, ride a bike, or find other ways to be active. This can help with stress and muscle tension. · Get regular sleep. · Eat regularly and well. If you wait too long to eat, it can trigger a headache. · If you have the time and money, you may want to try massage. Some people find that regular massages really help relieve tension. · Try to use good posture and keep the muscles of your jaw, face, neck, and shoulders relaxed. If you sit at a desk, change positions often. Try to stretch for 30 seconds every hour. · If you use a computer a lot, you can do things to make your eyes less tired. Try blinking more and sometimes looking away from the screen.  Be sure to use glasses or contacts if you need them. And check that your monitor is about an arm's distance away from you. When should you call for help? Call 911 anytime you think you may need emergency care. For example, call if:    · You have signs of a stroke. These may include:  ? Sudden numbness, paralysis, or weakness in your face, arm, or leg, especially on only one side of your body. ? Sudden vision changes. ? Sudden trouble speaking. ? Sudden confusion or trouble understanding simple statements. ? Sudden problems with walking or balance. ? A sudden, severe headache that is different from past headaches. Call your doctor now or seek immediate medical care if:    · You have new or worse nausea and vomiting.     · You have a new or higher fever.     · Your headache gets much worse. Watch closely for changes in your health, and be sure to contact your doctor if:    · You are not getting better after 2 days (48 hours). Where can you learn more? Go to https://"Diagnotes[employer] Inc.".G.I. Windows. org and sign in to your WazeTrip account. Enter 16 17 36 in the [employer] box to learn more about \"Tension Headache: Care Instructions. \"     If you do not have an account, please click on the \"Sign Up Now\" link. Current as of: August 4, 2020               Content Version: 12.9  © 2006-2021 Healthwise, Incorporated. Care instructions adapted under license by [facility] ([facility]). If you have questions about a medical condition or this instruction, always ask your healthcare professional. [place] [address] any warranty or liability for your use of this information.

## 2021-06-21 ENCOUNTER — TELEPHONE (OUTPATIENT)
Dept: NEUROSURGERY | Age: 21
End: 2021-06-21

## 2021-06-21 NOTE — TELEPHONE ENCOUNTER
Called patient to schedule an appointment, patient states he didn't need an appointment, stated he was on some medication and that it was helping.

## 2021-07-19 ENCOUNTER — HOSPITAL ENCOUNTER (OUTPATIENT)
Dept: GENERAL RADIOLOGY | Facility: HOSPITAL | Age: 21
Discharge: HOME | End: 2021-07-19
Admitting: NURSE PRACTITIONER

## 2021-07-19 PROCEDURE — 87635 SARS-COV-2 COVID-19 AMP PRB: CPT | Performed by: NURSE PRACTITIONER

## 2021-07-19 PROCEDURE — 71046 X-RAY EXAM CHEST 2 VIEWS: CPT

## 2021-09-07 ENCOUNTER — NURSE TRIAGE (OUTPATIENT)
Dept: CALL CENTER | Facility: HOSPITAL | Age: 21
End: 2021-09-07

## 2021-09-08 ENCOUNTER — HOSPITAL ENCOUNTER (EMERGENCY)
Facility: HOSPITAL | Age: 21
Discharge: HOME | End: 2021-09-08
Attending: EMERGENCY MEDICINE | Admitting: EMERGENCY MEDICINE

## 2021-09-08 VITALS
HEART RATE: 69 BPM | TEMPERATURE: 98.3 F | RESPIRATION RATE: 16 BRPM | OXYGEN SATURATION: 98 % | SYSTOLIC BLOOD PRESSURE: 128 MMHG | HEIGHT: 71 IN | BODY MASS INDEX: 34.44 KG/M2 | DIASTOLIC BLOOD PRESSURE: 71 MMHG | WEIGHT: 246 LBS

## 2021-09-08 DIAGNOSIS — R30.0 DYSURIA: ICD-10-CM

## 2021-09-08 DIAGNOSIS — S39.94XA INJURY TO PENIS, INITIAL ENCOUNTER: Primary | ICD-10-CM

## 2021-09-08 PROCEDURE — 99282 EMERGENCY DEPT VISIT SF MDM: CPT

## 2021-09-08 RX ORDER — PHENAZOPYRIDINE HYDROCHLORIDE 200 MG/1
200 TABLET, FILM COATED ORAL 3 TIMES DAILY PRN
Qty: 9 TABLET | Refills: 0 | Status: SHIPPED | OUTPATIENT
Start: 2021-09-08

## 2021-09-08 NOTE — ED PROVIDER NOTES
Subjective   Patient is a 20-year-old male who presents to the ER with penile issues.  Patient states last night his girlfriend decided she needed to help him hold his penis while using the bathroom.  Patient states she pinched it so hard on the tip of his penis that he swatted her hand away.  Patient states he then looked down and noticed blood dripping from the urethra.  Patient states that the bleeding stopped and he was able to urinate without any issues.  He has had no bleeding since that time.  Patient states he woke up this morning and anytime he tries to urinate he has burning at the tip of his penis and notices swelling at the tip of his glans penis.  He states he is able to urinate but just has swelling and pain.  Patient states that after he urinates, the swelling resolves.  He denies being on blood thinners or bleeding elsewhere.  He denies any fever, chest pain, shortness of air, abdominal pain, nausea vomiting diarrhea, neurologic changes.          Review of Systems   Constitutional: Negative.    HENT: Negative.    Eyes: Negative.    Respiratory: Negative.    Cardiovascular: Negative.    Gastrointestinal: Negative.    Endocrine: Negative.    Genitourinary: Positive for dysuria, hematuria and penile swelling.   Musculoskeletal: Negative.    Skin: Negative.    Allergic/Immunologic: Negative.    Neurological: Negative.    Hematological: Negative.    Psychiatric/Behavioral: Negative.    All other systems reviewed and are negative.      Past Medical History:   Diagnosis Date   • ADHD (attention deficit hyperactivity disorder)        No Known Allergies    No past surgical history on file.    No family history on file.    Social History     Socioeconomic History   • Marital status: Single     Spouse name: Not on file   • Number of children: Not on file   • Years of education: Not on file   • Highest education level: Not on file   Tobacco Use   • Smoking status: Current Some Day Smoker     Types: Cigarettes    • Smokeless tobacco: Never Used   Vaping Use   • Vaping Use: Unknown   Substance and Sexual Activity   • Sexual activity: Yes           Objective   Physical Exam  Vitals and nursing note reviewed.   Constitutional:       Appearance: He is well-developed.   HENT:      Head: Normocephalic and atraumatic.   Eyes:      Conjunctiva/sclera: Conjunctivae normal.      Pupils: Pupils are equal, round, and reactive to light.   Cardiovascular:      Rate and Rhythm: Normal rate and regular rhythm.      Heart sounds: Normal heart sounds.   Pulmonary:      Effort: Pulmonary effort is normal.      Breath sounds: Normal breath sounds.   Abdominal:      Palpations: Abdomen is soft.      Tenderness: There is no abdominal tenderness.   Genitourinary:     Penis: Normal and circumcised. No erythema, tenderness, discharge, swelling or lesions.       Testes: Normal.      Comments: No obvious edema, lacerations, bleeding, or signs of trauma  Musculoskeletal:         General: No deformity. Normal range of motion.      Cervical back: Normal range of motion.   Skin:     General: Skin is warm.   Neurological:      Mental Status: He is alert and oriented to person, place, and time.   Psychiatric:         Behavior: Behavior normal.         Procedures           ED Course      I discussed the case with Dr. Tolentino with urology.  Since the patient has a normal exam and is urinating, he said no further work-up was needed at this time.  He recommended discharging the patient with Pyridium and advised the patient no intercourse or ejaculation for several days.  Patient was given strict instructions to return to the ER immediately if he has difficulty urinating, for further bleeding, or if his swelling returns and does not resolve.  Patient agreeable.                                     MDM    Final diagnoses:   Injury to penis, initial encounter   Dysuria       ED Disposition  ED Disposition     ED Disposition Condition Comment    Discharge Good            Drew Duarte MD  [address]  [address]  [facility] [address]  [phone]    Schedule an appointment as soon as possible for a visit       Lance Tolentino MD  [address]  [address]  [facility] [address]  [phone]    Schedule an appointment as soon as possible for a visit            Medication List      New Prescriptions    phenazopyridine 200 MG tablet  Commonly known as: PYRIDIUM  Take 1 tablet by mouth 3 (Three) Times a Day As Needed (dysuria).           Where to Get Your Medications      These medications were sent to [id number] - [place], [place] - [address] AT [facility] [address] - [phone] [facility] [phone] [address]  [address], [facility] [address]    Phone: [phone]   · phenazopyridine 200 MG tablet          Lorelei Lund MD  09/08/21 3814

## 2021-09-08 NOTE — TELEPHONE ENCOUNTER
"    Reason for Disposition  • [1] Blood from end of penis AND [2] large amount    Additional Information  • Negative: Followed a genital area injury (e.g., penis, scrotum)  • Negative: Pain or burning with passing urine is main symptom  • Negative: Pain in scrotum or testicle is main symptom  • Negative: Swollen scrotum OR lump in the scrotum/groin area  • Negative: Pubic lice suspected    Answer Assessment - Initial Assessment Questions  1. SYMPTOM: \"What's the main symptom you're concerned about?\" (e.g., discharge from penis, rash, pain, itching, swelling)      Bleeding from penis, head swollen, severe pain with erection  2. LOCATION: \"Where is the pain located?\"      penis  3. ONSET: \"When did pain and bleeding  start?\"      tonight  4. PAIN: \"Is there any pain?\" If Yes, ask: \"How bad is it?\"  (Scale 1-10; or mild, moderate, severe)      severe  5. URINE: \"Any difficulty passing urine?\" If Yes, ask: \"When was the last time?\"      Yes tonight  6. CAUSE: \"What do you think is causing the symptoms?\"      unknown  7. OTHER SYMPTOMS: \"Do you have any other symptoms?\" (e.g., fever, abdominal pain, blood in urine)      Blood in urine, bleeding from head of penis, severe pain, pain with erection, swelling    Protocols used: PENIS AND SCROTUM SYMPTOMS-ADULT-      "

## 2021-09-13 NOTE — PROGRESS NOTES
Patient swabbed for COVID-19 using [employer] vendor but was not evaluated inside the clinic. Patient specimen collected in drive through in patients private vehicle due to order present from primary care provider. Patient was not evaluated by flu clinic provider.
no pain, swelling or deformity of joints

## 2023-10-04 ENCOUNTER — APPOINTMENT (OUTPATIENT)
Dept: GENERAL RADIOLOGY | Facility: HOSPITAL | Age: 23
End: 2023-10-04
Payer: COMMERCIAL

## 2023-10-04 ENCOUNTER — HOSPITAL ENCOUNTER (EMERGENCY)
Facility: HOSPITAL | Age: 23
Discharge: HOME | End: 2023-10-04
Admitting: EMERGENCY MEDICINE
Payer: COMMERCIAL

## 2023-10-04 ENCOUNTER — APPOINTMENT (OUTPATIENT)
Dept: CT IMAGING | Facility: HOSPITAL | Age: 23
End: 2023-10-04
Payer: COMMERCIAL

## 2023-10-04 VITALS
BODY MASS INDEX: 35.56 KG/M2 | WEIGHT: 254 LBS | RESPIRATION RATE: 16 BRPM | TEMPERATURE: 97.4 F | HEIGHT: 71 IN | OXYGEN SATURATION: 99 % | SYSTOLIC BLOOD PRESSURE: 140 MMHG | DIASTOLIC BLOOD PRESSURE: 71 MMHG | HEART RATE: 69 BPM

## 2023-10-04 DIAGNOSIS — R04.0 EPISTAXIS: ICD-10-CM

## 2023-10-04 DIAGNOSIS — R55 NEAR SYNCOPE: Primary | ICD-10-CM

## 2023-10-04 LAB
ALBUMIN SERPL-MCNC: 4.7 G/DL (ref 3.5–5.2)
ALBUMIN/GLOB SERPL: 2.4 G/DL
ALP SERPL-CCNC: 70 U/L (ref 39–117)
ALT SERPL W P-5'-P-CCNC: 28 U/L (ref 1–41)
AMPHET+METHAMPHET UR QL: NEGATIVE
AMPHETAMINES UR QL: NEGATIVE
ANION GAP SERPL CALCULATED.3IONS-SCNC: 11 MMOL/L (ref 5–15)
AST SERPL-CCNC: 23 U/L (ref 1–40)
BACTERIA UR QL AUTO: ABNORMAL /HPF
BARBITURATES UR QL SCN: NEGATIVE
BASOPHILS # BLD AUTO: 0.04 10*3/MM3 (ref 0–0.2)
BASOPHILS NFR BLD AUTO: 0.6 % (ref 0–1.5)
BENZODIAZ UR QL SCN: NEGATIVE
BILIRUB SERPL-MCNC: 0.6 MG/DL (ref 0–1.2)
BILIRUB UR QL STRIP: NEGATIVE
BUN SERPL-MCNC: 11 MG/DL (ref 6–20)
BUN/CREAT SERPL: 11.6 (ref 7–25)
BUPRENORPHINE SERPL-MCNC: NEGATIVE NG/ML
CALCIUM SPEC-SCNC: 8.9 MG/DL (ref 8.6–10.5)
CANNABINOIDS SERPL QL: NEGATIVE
CHLORIDE SERPL-SCNC: 105 MMOL/L (ref 98–107)
CLARITY UR: CLEAR
CO2 SERPL-SCNC: 25 MMOL/L (ref 22–29)
COCAINE UR QL: NEGATIVE
COLOR UR: YELLOW
CREAT SERPL-MCNC: 0.95 MG/DL (ref 0.76–1.27)
DEPRECATED RDW RBC AUTO: 35.9 FL (ref 37–54)
EGFRCR SERPLBLD CKD-EPI 2021: 115.3 ML/MIN/1.73
EOSINOPHIL # BLD AUTO: 0.11 10*3/MM3 (ref 0–0.4)
EOSINOPHIL NFR BLD AUTO: 1.6 % (ref 0.3–6.2)
ERYTHROCYTE [DISTWIDTH] IN BLOOD BY AUTOMATED COUNT: 11.4 % (ref 12.3–15.4)
FENTANYL UR-MCNC: NEGATIVE NG/ML
GLOBULIN UR ELPH-MCNC: 2 GM/DL
GLUCOSE SERPL-MCNC: 96 MG/DL (ref 65–99)
GLUCOSE UR STRIP-MCNC: NEGATIVE MG/DL
HCT VFR BLD AUTO: 43.9 % (ref 37.5–51)
HGB BLD-MCNC: 14.6 G/DL (ref 13–17.7)
HGB UR QL STRIP.AUTO: NEGATIVE
HYALINE CASTS UR QL AUTO: ABNORMAL /LPF
IMM GRANULOCYTES # BLD AUTO: 0.02 10*3/MM3 (ref 0–0.05)
IMM GRANULOCYTES NFR BLD AUTO: 0.3 % (ref 0–0.5)
INR PPP: 1.03 (ref 0.91–1.09)
KETONES UR QL STRIP: ABNORMAL
LEUKOCYTE ESTERASE UR QL STRIP.AUTO: ABNORMAL
LYMPHOCYTES # BLD AUTO: 2.85 10*3/MM3 (ref 0.7–3.1)
LYMPHOCYTES NFR BLD AUTO: 40.2 % (ref 19.6–45.3)
MCH RBC QN AUTO: 28.7 PG (ref 26.6–33)
MCHC RBC AUTO-ENTMCNC: 33.3 G/DL (ref 31.5–35.7)
MCV RBC AUTO: 86.2 FL (ref 79–97)
METHADONE UR QL SCN: NEGATIVE
MONOCYTES # BLD AUTO: 0.64 10*3/MM3 (ref 0.1–0.9)
MONOCYTES NFR BLD AUTO: 9 % (ref 5–12)
NEUTROPHILS NFR BLD AUTO: 3.43 10*3/MM3 (ref 1.7–7)
NEUTROPHILS NFR BLD AUTO: 48.3 % (ref 42.7–76)
NITRITE UR QL STRIP: NEGATIVE
NRBC BLD AUTO-RTO: 0 /100 WBC (ref 0–0.2)
OPIATES UR QL: NEGATIVE
OXYCODONE UR QL SCN: NEGATIVE
PCP UR QL SCN: NEGATIVE
PH UR STRIP.AUTO: 6 [PH] (ref 5–8)
PLATELET # BLD AUTO: 261 10*3/MM3 (ref 140–450)
PMV BLD AUTO: 9.1 FL (ref 6–12)
POTASSIUM SERPL-SCNC: 4.2 MMOL/L (ref 3.5–5.2)
PROPOXYPH UR QL: NEGATIVE
PROT SERPL-MCNC: 6.7 G/DL (ref 6–8.5)
PROT UR QL STRIP: ABNORMAL
PROTHROMBIN TIME: 13.6 SECONDS (ref 11.8–14.8)
RBC # BLD AUTO: 5.09 10*6/MM3 (ref 4.14–5.8)
RBC # UR STRIP: ABNORMAL /HPF
REF LAB TEST METHOD: ABNORMAL
SODIUM SERPL-SCNC: 141 MMOL/L (ref 136–145)
SP GR UR STRIP: >1.03 (ref 1–1.03)
SQUAMOUS #/AREA URNS HPF: ABNORMAL /HPF
TRICYCLICS UR QL SCN: NEGATIVE
UROBILINOGEN UR QL STRIP: ABNORMAL
WBC # UR STRIP: ABNORMAL /HPF
WBC NRBC COR # BLD: 7.09 10*3/MM3 (ref 3.4–10.8)

## 2023-10-04 PROCEDURE — 87086 URINE CULTURE/COLONY COUNT: CPT | Performed by: NURSE PRACTITIONER

## 2023-10-04 PROCEDURE — 25810000003 LACTATED RINGERS SOLUTION: Performed by: NURSE PRACTITIONER

## 2023-10-04 PROCEDURE — 81001 URINALYSIS AUTO W/SCOPE: CPT | Performed by: NURSE PRACTITIONER

## 2023-10-04 PROCEDURE — 80307 DRUG TEST PRSMV CHEM ANLYZR: CPT | Performed by: NURSE PRACTITIONER

## 2023-10-04 PROCEDURE — 99284 EMERGENCY DEPT VISIT MOD MDM: CPT

## 2023-10-04 PROCEDURE — 85610 PROTHROMBIN TIME: CPT | Performed by: NURSE PRACTITIONER

## 2023-10-04 PROCEDURE — 85025 COMPLETE CBC W/AUTO DIFF WBC: CPT | Performed by: NURSE PRACTITIONER

## 2023-10-04 PROCEDURE — 71045 X-RAY EXAM CHEST 1 VIEW: CPT

## 2023-10-04 PROCEDURE — 80053 COMPREHEN METABOLIC PANEL: CPT | Performed by: NURSE PRACTITIONER

## 2023-10-04 PROCEDURE — 93010 ELECTROCARDIOGRAM REPORT: CPT | Performed by: EMERGENCY MEDICINE

## 2023-10-04 PROCEDURE — 93005 ELECTROCARDIOGRAM TRACING: CPT

## 2023-10-04 PROCEDURE — 70450 CT HEAD/BRAIN W/O DYE: CPT

## 2023-10-04 RX ADMIN — SODIUM CHLORIDE, POTASSIUM CHLORIDE, SODIUM LACTATE AND CALCIUM CHLORIDE 500 ML: 600; 310; 30; 20 INJECTION, SOLUTION INTRAVENOUS at 14:58

## 2023-10-04 NOTE — DISCHARGE INSTRUCTIONS
Return to ER if symptoms worsen   Use saline nasal spray to prevent epistaxis.   Follow up with one of the [facility] physician groups below to setup primary care. If you have trouble making an appointment, please call the [facility] Nurse Line at [phone]    [facility] [place]  [address] [place] [address]  [id number] [phone]    [facility] - [address], [address], [place], [place] [id number] [phone]    [facility] - [address], [address], [place], [place] [address] [phone]     [facility] ([facility] - [place]  [address], [address], [place], [place] [address] [phone]    [facility] - [address], [address] [phone]    [facility] - [address], [place] [address] [phone]    [facility] [place]  [address] [place], [place], [address] [phone]    [facility] [place]  [address], [address] [phone]    [facility] - [address] [address] [phone]    [facility] [place]  [address] [place] [address], [address], [place], [place], [address] [phone]        PEDIATRIC:    [facility] - [place] [address], [address], [place] [address] [phone]

## 2023-10-04 NOTE — ED NOTES
Patient stated he has been having nose bleeds approx 1-2 times a week. Generally a large amount of blood.   Provider notified.

## 2023-10-04 NOTE — Clinical Note
[facility] EMERGENCY DEPARTMENT  [address]  [facility] [address]  Phone: [phone]    Dominic Sharpe was seen and treated in our emergency department on 10/4/2023.  He may return to work on 10/06/2023.         Thank you for choosing [facility].    Mitali Carrero APRN

## 2023-10-04 NOTE — ED PROVIDER NOTES
Subjective   History of Present Illness  Patient is a 22-year-old male presents emergency department with a near syncopal episode today.  He states that he has been having these episodes intermittently for the past year.  He states he is also been having nosebleeds about every 2 weeks as well.  He does not have a PCP and has not had lab work in quite some time.  He states today that he got really dizzy before he collapsed.  He states he was only out maybe a second.  He states he does not think that he completely lost consciousness.  He states he was very weak and nauseated.  He denies head injury he denies any fever or chills.  No cough or congestion.    History provided by:  Patient   used: No      Review of Systems   Constitutional: Negative.    HENT: Negative.     Eyes: Negative.    Respiratory: Negative.     Cardiovascular: Negative.    Gastrointestinal: Negative.    Endocrine: Negative.    Genitourinary: Negative.    Musculoskeletal: Negative.    Skin: Negative.    Allergic/Immunologic: Negative.    Neurological:         Patient is a 22-year-old male presents emergency department with a near syncopal episode today.  He states that he has been having these episodes intermittently for the past year.  He states he is also been having nosebleeds about every 2 weeks as well.  He does not have a PCP and has not had lab work in quite some time.  He states today that he got really dizzy before he collapsed.  He states he was only out maybe a second.  He states he does not think that he completely lost consciousness.  He states he was very weak and nauseated.  He denies head injury he denies any fever or chills.  No cough or congestion.     Hematological: Negative.    Psychiatric/Behavioral: Negative.     All other systems reviewed and are negative.    Past Medical History:   Diagnosis Date    ADHD (attention deficit hyperactivity disorder)        No Known Allergies    History reviewed. No pertinent  "surgical history.    History reviewed. No pertinent family history.    Social History     Socioeconomic History    Marital status: Single   Tobacco Use    Smoking status: Some Days     Types: Cigarettes    Smokeless tobacco: Never   Vaping Use    Vaping Use: Unknown   Substance and Sexual Activity    Sexual activity: Yes       Prior to Admission medications    Medication Sig Start Date End Date Taking? Authorizing Provider   ondansetron ODT (ZOFRAN-ODT) 8 MG disintegrating tablet Place 1 tablet on the tongue Every 8 (Eight) Hours As Needed for Nausea. 4/13/23   Sravani Salcedo, [place]       /71   Pulse 69   Temp 97.4 °F (36.3 °C) (Oral)   Resp 16   Ht 180.3 cm (71\")   Wt 115 kg (254 lb)   SpO2 99%   BMI 35.43 kg/m²     Objective   Physical Exam  Vitals and nursing note reviewed.   Constitutional:       Appearance: He is well-developed.      Comments: Nontoxic-appearing.  No acute distress.   HENT:      Head: Normocephalic and atraumatic.      Nose:      Comments: No epistaxis noted  Eyes:      Conjunctiva/sclera: Conjunctivae normal.      Pupils: Pupils are equal, round, and reactive to light.   Cardiovascular:      Rate and Rhythm: Normal rate and regular rhythm.      Heart sounds: Normal heart sounds.   Pulmonary:      Effort: Pulmonary effort is normal.      Breath sounds: Normal breath sounds.   Abdominal:      General: Bowel sounds are normal.      Palpations: Abdomen is soft.   Musculoskeletal:         General: Normal range of motion.      Cervical back: Normal range of motion and neck supple.   Skin:     General: Skin is warm and dry.   Neurological:      Mental Status: He is alert and oriented to person, place, and time.      Deep Tendon Reflexes: Reflexes are normal and symmetric.   Psychiatric:         Behavior: Behavior normal.         Thought Content: Thought content normal.         Judgment: Judgment normal.       Procedures         Lab Results (last 24 hours)       Procedure Component Value " Units Date/Time    CBC & Differential [[id number]]  (Abnormal) Collected: 10/04/23 1457    Specimen: Blood Updated: 10/04/23 1508    Narrative:      The following orders were created for panel order CBC & Differential.  Procedure                               Abnormality         Status                     ---------                               -----------         ------                     CBC Auto Differential[[id number]]         Abnormal            Final result                 Please view results for these tests on the individual orders.    Comprehensive Metabolic Panel [[id number]] Collected: 10/04/23 1457    Specimen: Blood Updated: 10/04/23 1525     Glucose 96 mg/dL      BUN 11 mg/dL      Creatinine 0.95 mg/dL      Sodium 141 mmol/L      Potassium 4.2 mmol/L      Chloride 105 mmol/L      CO2 25.0 mmol/L      Calcium 8.9 mg/dL      Total Protein 6.7 g/dL      Albumin 4.7 g/dL      ALT (SGPT) 28 U/L      AST (SGOT) 23 U/L      Alkaline Phosphatase 70 U/L      Total Bilirubin 0.6 mg/dL      Globulin 2.0 gm/dL      A/G Ratio 2.4 g/dL      BUN/Creatinine Ratio 11.6     Anion Gap 11.0 mmol/L      eGFR 115.3 mL/min/1.73     Narrative:      GFR Normal >60  Chronic Kidney Disease <60  Kidney Failure <15      Protime-INR [[id number]]  (Normal) Collected: 10/04/23 1457    Specimen: Blood Updated: 10/04/23 1519     Protime 13.6 Seconds      INR 1.03    CBC Auto Differential [[id number]]  (Abnormal) Collected: 10/04/23 1457    Specimen: Blood Updated: 10/04/23 1508     WBC 7.09 10*3/mm3      RBC 5.09 10*6/mm3      Hemoglobin 14.6 g/dL      Hematocrit 43.9 %      MCV 86.2 fL      MCH 28.7 pg      MCHC 33.3 g/dL      RDW 11.4 %      RDW-SD 35.9 fl      MPV 9.1 fL      Platelets 261 10*3/mm3      Neutrophil % 48.3 %      Lymphocyte % 40.2 %      Monocyte % 9.0 %      Eosinophil % 1.6 %      Basophil % 0.6 %      Immature Grans % 0.3 %      Neutrophils, Absolute 3.43 10*3/mm3      Lymphocytes, Absolute 2.85 10*3/mm3      Monocytes,  Absolute 0.64 10*3/mm3      Eosinophils, Absolute 0.11 10*3/mm3      Basophils, Absolute 0.04 10*3/mm3      Immature Grans, Absolute 0.02 10*3/mm3      nRBC 0.0 /100 WBC     Urinalysis With Culture If Indicated - Urine, Clean Catch [[id number]]  (Abnormal) Collected: 10/04/23 1627    Specimen: Urine, Clean Catch Updated: 10/04/23 1645     Color, UA Yellow     Appearance, UA Clear     pH, UA 6.0     Specific Gravity, UA >1.030     Glucose, UA Negative     Ketones, UA 40 mg/dL (2+)     Bilirubin, UA Negative     Blood, UA Negative     Protein, UA Trace     Leuk Esterase, UA Trace     Nitrite, UA Negative     Urobilinogen, UA 1.0 E.U./dL    Narrative:      In absence of clinical symptoms, the presence of pyuria, bacteria, and/or nitrites on the urinalysis result does not correlate with infection.    Urine Drug Screen - Urine, Clean Catch [[id number]]  (Normal) Collected: 10/04/23 1627    Specimen: Urine, Clean Catch Updated: 10/04/23 1646     THC, Screen, Urine Negative     Phencyclidine (PCP), Urine Negative     Cocaine Screen, Urine Negative     Methamphetamine, Ur Negative     Opiate Screen Negative     Amphetamine Screen, Urine Negative     Benzodiazepine Screen, Urine Negative     Tricyclic Antidepressants Screen Negative     Methadone Screen, Urine Negative     Barbiturates Screen, Urine Negative     Oxycodone Screen, Urine Negative     Propoxyphene Screen Negative     Buprenorphine, Screen, Urine Negative    Narrative:      Cutoff For Drugs Screened:    Amphetamines               500 ng/ml  Barbiturates               200 ng/ml  Benzodiazepines            150 ng/ml  Cocaine                    150 ng/ml  Methadone                  200 ng/ml  Opiates                    100 ng/ml  Phencyclidine               25 ng/ml  THC                            50 ng/ml  Methamphetamine            500 ng/ml  Tricyclic Antidepressants  300 ng/ml  Oxycodone                  100 ng/ml  Propoxyphene               300  ng/ml  Buprenorphine               10 ng/ml    The normal value for all drugs tested is negative. This report includes unconfirmed screening results, with the cutoff values listed, to be used for medical treatment purposes only.  Unconfirmed results must not be used for non-medical purposes such as employment or legal testing.  Clinical consideration should be applied to any drug of abuse test, particularly when unconfirmed results are used.      Fentanyl, Urine - Urine, Clean Catch [[id number]]  (Normal) Collected: 10/04/23 1627    Specimen: Urine, Clean Catch Updated: 10/04/23 1652     Fentanyl, Urine Negative    Narrative:      Negative Threshold:      Fentanyl 5 ng/mL     The normal value for the drug tested is negative. This report includes final unconfirmed screening results to be used for medical treatment purposes only. Unconfirmed results must not be used for non-medical purposes such as employment or legal testing. Clinical consideration should be applied to any drug of abuse test, particularly when unconfirmed results are used.           Urinalysis, Microscopic Only - Urine, Clean Catch [[id number]]  (Abnormal) Collected: 10/04/23 1627    Specimen: Urine, Clean Catch Updated: 10/04/23 1645     RBC, UA 0-2 /HPF      WBC, UA 6-12 /HPF      Bacteria, UA None Seen /HPF      Squamous Epithelial Cells, UA None Seen /HPF      Hyaline Casts, UA 0-2 /LPF      Methodology Automated Microscopy    Urine Culture - Urine, Urine, Clean Catch [[id number]] Collected: 10/04/23 1627    Specimen: Urine, Clean Catch Updated: 10/04/23 1645            XR Chest 1 View   Final Result   1. No acute cardiopulmonary findings.       This report was signed and finalized on 10/4/2023 3:16 PM CDT by Dr Lorelei Mcgee MD.          CT Head Without Contrast   Final Result   1. No acute intracranial findings.        This report was signed and finalized on 10/4/2023 3:16 PM CDT by Dr Lorelei Mcgee MD.              ED Course  ED Course as  of 10/04/23 1751   Wed Oct 04, 2023   1647 Laboratory studies CMP is unremarkable.  CBC is unremarkable.  Urinalysis shows 40 ketones trace of protein trace leukocytes no bacteria.  CT of the head was negative for anything acute.  Chest x-ray was unremarkable.  He had a normal EKG as well.  UDS is negative.  He had lactated Ringer's 500 cc bolus while in the emergency department.  His vitals are stable.  He has had no active epistaxis while he has been in the emergency department.  Advised the patient I would order outpatient Holter monitor for him to monitor while he is having these weakness and syncopal events.  I also advised the patient he will probably need to follow-up with ENT for chronic nosebleeds as well.  Advised the need to establish and see a primary care provider as well.  We will send patient with a list of providers.  He is in agreement with the care plan.  Patient be discharged shortly in stable condition. [CW]      ED Course User Index  [CW] Mitali Carrero APRN        Medical Decision Making  Patient is a 22-year-old male presents emergency department with a near syncopal episode today.  He states that he has been having these episodes intermittently for the past year.  He states he is also been having nosebleeds about every 2 weeks as well.  He does not have a PCP and has not had lab work in quite some time.  He states today that he got really dizzy before he collapsed.  He states he was only out maybe a second.  He states he does not think that he completely lost consciousness.  He states he was very weak and nauseated.  He denies head injury he denies any fever or chills.  No cough or congestion.  Course of treatment in the ED: Patient is a 22-year-old male present emergency department stating he had a near syncopal episode today.  He states he has been having these episodes off and on for the past year.  He states he also gets nosebleeds when this happens.  He does complain of a  headache as well.  He denies any chest pain or shortness of breath.  Denies any palpitations.Patient is a 22-year-old male presents emergency department with a near syncopal episode today.  He states that he has been having these episodes intermittently for the past year.  He states he is also been having nosebleeds about every 2 weeks as well.  He does not have a PCP and has not had lab work in quite some time.  He states today that he got really dizzy before he collapsed.  He states he was only out maybe a second.  He states he does not think that he completely lost consciousness.  He states he was very weak and nauseated.  He denies head injury he denies any fever or chills.  No cough or congestion.  He is nontoxic-appearing.  No acute distress.  No epistaxis noted on exam.  Lungs clear to auscultation.  CVS sinus rhythm.  XR Chest 1 View   Final Result    1. No acute cardiopulmonary findings.         This report was signed and finalized on 10/4/2023 3:16 PM CDT by Dr Lorelei Mcgee MD.          CT Head Without Contrast   Final Result    1. No acute intracranial findings.          This report was signed and finalized on 10/4/2023 3:16 PM CDT by Dr Lorelei Mcgee MD.          Labs Reviewed  URINALYSIS W/ CULTURE IF INDICATED - Abnormal; Notable for the following components:     Specific Gravity, UA          >1.030 (*)               Ketones, UA                     (*)                  Protein, UA                   Trace (*)               Leuk Esterase, UA             Trace (*)            All other components within normal limits         Narrative: In absence of clinical symptoms, the presence of pyuria, bacteria, and/or nitrites on the urinalysis result does not correlate with infection.  CBC WITH AUTO DIFFERENTIAL - Abnormal; Notable for the following components:     RDW                           11.4 (*)               RDW-SD                        35.9 (*)            All other components within normal  limits  URINALYSIS, MICROSCOPIC ONLY - Abnormal; Notable for the following components:     RBC, UA                       0-2 (*)                WBC, UA                       6-12 (*)            All other components within normal limits  PROTIME-INR - Normal  URINE DRUG SCREEN - Normal         Narrative: Cutoff For Drugs Screened:                                    Amphetamines               500 ng/ml                  Barbiturates               200 ng/ml                  Benzodiazepines            150 ng/ml                  Cocaine                    150 ng/ml                  Methadone                  200 ng/ml                  Opiates                    100 ng/ml                  Phencyclidine               25 ng/ml                  THC                            50 ng/ml                  Methamphetamine            500 ng/ml                  Tricyclic Antidepressants  300 ng/ml                  Oxycodone                  100 ng/ml                  Propoxyphene               300 ng/ml                  Buprenorphine               10 ng/ml                                    The normal value for all drugs tested is negative. This report includes unconfirmed screening results, with the cutoff values listed, to be used for medical treatment purposes only.  Unconfirmed results must not be used for non-medical purposes such as employment or legal testing.  Clinical consideration should be applied to any drug of abuse test, particularly when unconfirmed results are used.    FENTANYL, URINE - Normal         Narrative: Negative Threshold:                                      Fentanyl 5 ng/mL                                     The normal value for the drug tested is negative. This report includes final unconfirmed screening results to be used for medical treatment purposes only. Unconfirmed results must not be used for non-medical purposes such as employment or legal testing. Clinical consideration should be applied to any  drug of abuse test, particularly when unconfirmed results are used.         URINE CULTURE  COMPREHENSIVE METABOLIC PANEL         Narrative: GFR Normal >60                  Chronic Kidney Disease <60                  Kidney Failure <15                    CBC AND DIFFERENTIAL  CT of the head was negative for any acute intracranial abnormality.  Chest x-ray was unremarkable.  Laboratory studies were unremarkable.  He had a normal platelet count.  White count was normal.  Patient's had no epistaxis while he is in the emergency department.  It sounds like the symptoms have been going on intermittently for the past year.  Advised patient he needs to establish with a primary care provider for further work-up.  Advised patient he needs to follow-up with ENT for epistaxis.  Reviewed instructions with the patient.  He is in agreement with the care plan.  He was discharged in stable stable condition.  Differential diagnosis: Thrombocytopenia; intracranial hemorrhage; electrolyte imbalance    Problems Addressed:  Epistaxis: complicated acute illness or injury  Near syncope: complicated acute illness or injury    Amount and/or Complexity of Data Reviewed  Labs: ordered. Decision-making details documented in ED Course.  Radiology: ordered. Decision-making details documented in ED Course.         Final diagnoses:   Near syncope   Epistaxis          Mitali Carrero, APRN  10/04/23 2943

## 2023-10-05 LAB
BACTERIA SPEC AEROBE CULT: NO GROWTH
QT INTERVAL: 448 MS
QTC INTERVAL: 443 MS